# Patient Record
Sex: FEMALE | Race: WHITE | NOT HISPANIC OR LATINO | Employment: FULL TIME | URBAN - METROPOLITAN AREA
[De-identification: names, ages, dates, MRNs, and addresses within clinical notes are randomized per-mention and may not be internally consistent; named-entity substitution may affect disease eponyms.]

---

## 2021-10-02 ENCOUNTER — IMMUNIZATION (OUTPATIENT)
Dept: VACCINE CLINIC | Facility: HOSPITAL | Age: 61
End: 2021-10-02

## 2021-10-02 PROCEDURE — 91300 HC SARSCOV02 VAC 30MCG/0.3ML IM: CPT | Performed by: INTERNAL MEDICINE

## 2021-10-02 PROCEDURE — 0001A: CPT | Performed by: INTERNAL MEDICINE

## 2021-10-02 PROCEDURE — 0004A ADM SARSCOV2 30MCG/0.3ML BOOSTER: CPT | Performed by: INTERNAL MEDICINE

## 2021-10-29 ENCOUNTER — OFFICE VISIT (OUTPATIENT)
Dept: INTERNAL MEDICINE | Facility: CLINIC | Age: 61
End: 2021-10-29

## 2021-10-29 VITALS
WEIGHT: 126.8 LBS | TEMPERATURE: 97.1 F | SYSTOLIC BLOOD PRESSURE: 127 MMHG | OXYGEN SATURATION: 97 % | HEIGHT: 67 IN | DIASTOLIC BLOOD PRESSURE: 72 MMHG | BODY MASS INDEX: 19.9 KG/M2 | HEART RATE: 102 BPM

## 2021-10-29 DIAGNOSIS — M25.361 KNEE INSTABILITY, RIGHT: Chronic | ICD-10-CM

## 2021-10-29 DIAGNOSIS — Z00.00 ROUTINE ADULT HEALTH MAINTENANCE: Primary | ICD-10-CM

## 2021-10-29 DIAGNOSIS — Z98.890 HISTORY OF BENIGN BREAST BIOPSY: ICD-10-CM

## 2021-10-29 DIAGNOSIS — E55.9 VITAMIN D DEFICIENCY: ICD-10-CM

## 2021-10-29 DIAGNOSIS — M77.11 RIGHT TENNIS ELBOW: Chronic | ICD-10-CM

## 2021-10-29 PROBLEM — E56.9 VITAMIN DEFICIENCY: Status: ACTIVE | Noted: 2021-10-29

## 2021-10-29 PROCEDURE — 99386 PREV VISIT NEW AGE 40-64: CPT | Performed by: NURSE PRACTITIONER

## 2021-10-29 RX ORDER — MELATONIN
1000 DAILY
COMMUNITY
End: 2022-10-14

## 2021-10-29 RX ORDER — CETIRIZINE HYDROCHLORIDE 10 MG/1
10 TABLET ORAL DAILY
COMMUNITY

## 2021-10-29 NOTE — PROGRESS NOTES
"Chief Complaint  Establish Care (new patient)/Annual Physical    Subjective          Soledad Gee presents to [facility]  History of Present Illness  This is a 59 y/o female presenting to office to establish care and for annual physical. Patient is currently living alone. Patient is  but currently . Patient has 3 adult children. Patient currently works in neurology as a nurse practitioner.    Patient reports current exercise-- daily. Patient reports following healthy diet-- patient currently follows special diet of gluten free and meat free.     Patient denies any tobacco use. Patient reports current occasional alcohol use-- maybe 2-3 drinks per month. Patient denies illicit drug use.     Patient reports she is not currently following with gynecological. Patient reports last pap smear 3 years ago.     Patient does have history of left excisional breast biopsy that was found benign in her age of 22's. Patient would like to have mammogram done this year. Patient reports she would like to do cologuard this year as well.     Patient reports history of right knee surgery-- patient had disc removed; patient also reports some instability in right knee. Patient reporting right tennis elbow. Some mild tenderness and pain/discomfort. Patient would like to pursue PT for these issues.     Objective   Vital Signs:   /72 (BP Location: Left arm, Patient Position: Sitting, Cuff Size: Adult)   Pulse 102   Temp 97.1 °F (36.2 °C) (Temporal)   Ht 170.2 cm (67\")   Wt 57.5 kg (126 lb 12.8 oz)   SpO2 97%   BMI 19.86 kg/m²     Physical Exam  Vitals and nursing note reviewed.   Constitutional:       Appearance: Normal appearance. She is normal weight.   HENT:      Head: Normocephalic and atraumatic.   Eyes:      Extraocular Movements: Extraocular movements intact.      Conjunctiva/sclera: Conjunctivae normal.      Pupils: Pupils are equal, round, and reactive to light. "   Cardiovascular:      Rate and Rhythm: Normal rate and regular rhythm.      Pulses: Normal pulses.      Heart sounds: Normal heart sounds. No murmur heard.  No friction rub. No gallop.    Pulmonary:      Effort: Pulmonary effort is normal. No respiratory distress.      Breath sounds: Normal breath sounds. No stridor. No wheezing, rhonchi or rales.   Abdominal:      General: There is no distension.      Palpations: Abdomen is soft.      Tenderness: There is no abdominal tenderness.   Musculoskeletal:         General: Tenderness present.      Cervical back: Normal range of motion and neck supple.      Comments: Right knee instability   Skin:     General: Skin is warm and dry.   Neurological:      General: No focal deficit present.      Mental Status: She is alert and oriented to person, place, and time. Mental status is at baseline.   Psychiatric:         Mood and Affect: Mood normal.         Behavior: Behavior normal.         Thought Content: Thought content normal.         Judgment: Judgment normal.        Result Review :            Assessment and Plan    Diagnoses and all orders for this visit:    1. Routine adult health maintenance (Primary)  Assessment & Plan:  Continue with 150 minutes weekly exercise.   Continue with healthy choices per [employer] food guidance.  A1C/CBC/CMP/Lipid Panel/Hep C screening.   Anticipatory guidance given regarding health prevention/wellness, diet/exercise, tobacco/alcohol/drug education, exercise and wellbeing, covid 19 guidance, and sexual health/STD education.       Orders:  -     Cologuard - Stool, Per Rectum; Future  -     Mammo Screening Bilateral With CAD; Future  -     Hepatitis C antibody; Future  -     Cancel: CBC No Differential; Future  -     Comprehensive metabolic panel; Future  -     Lipid panel; Future  -     Vitamin B12  -     Folate  -     CBC & Differential  -     Lipid panel  -     Comprehensive metabolic panel  -     Hepatitis C antibody    2. History of benign breast  biopsy    3. Knee instability, right  Assessment & Plan:  Refer to PT.     Orders:  -     Ambulatory Referral to Physical Therapy Evaluate and treat    4. Right tennis elbow  Assessment & Plan:  Refer to PT.     Orders:  -     Ambulatory Referral to Physical Therapy Evaluate and treat    5. Vitamin D deficiency  Assessment & Plan:  Recheck levels today.   Continues on supplementation    Orders:  -     Vitamin D 25 hydroxy      Follow Up   No follow-ups on file.  Patient was given instructions and counseling regarding her condition or for health maintenance advice. Please see specific information pulled into the AVS if appropriate.

## 2021-10-29 NOTE — PATIENT INSTRUCTIONS
Health Maintenance, Female  Adopting a healthy lifestyle and getting preventive care are important in promoting health and wellness. Ask your health care provider about:  · The right schedule for you to have regular tests and exams.  · Things you can do on your own to prevent diseases and keep yourself healthy.  What should I know about diet, weight, and exercise?  Eat a healthy diet    · Eat a diet that includes plenty of vegetables, fruits, low-fat dairy products, and lean protein.  · Do not eat a lot of foods that are high in solid fats, added sugars, or sodium.    Maintain a healthy weight  Body mass index (BMI) is used to identify weight problems. It estimates body fat based on height and weight. Your health care provider can help determine your BMI and help you achieve or maintain a healthy weight.  Get regular exercise  Get regular exercise. This is one of the most important things you can do for your health. Most adults should:  · Exercise for at least 150 minutes each week. The exercise should increase your heart rate and make you sweat (moderate-intensity exercise).  · Do strengthening exercises at least twice a week. This is in addition to the moderate-intensity exercise.  · Spend less time sitting. Even light physical activity can be beneficial.  Watch cholesterol and blood lipids  Have your blood tested for lipids and cholesterol at 20 years of age, then have this test every 5 years.  Have your cholesterol levels checked more often if:  · Your lipid or cholesterol levels are high.  · You are older than 40 years of age.  · You are at high risk for heart disease.  What should I know about cancer screening?  Depending on your health history and family history, you may need to have cancer screening at various ages. This may include screening for:  · Breast cancer.  · Cervical cancer.  · Colorectal cancer.  · Skin cancer.  · Lung cancer.  What should I know about heart disease, diabetes, and high blood  pressure?  Blood pressure and heart disease  · High blood pressure causes heart disease and increases the risk of stroke. This is more likely to develop in people who have high blood pressure readings, are of  descent, or are overweight.  · Have your blood pressure checked:  ? Every 3-5 years if you are 18-39 years of age.  ? Every year if you are 40 years old or older.  Diabetes  Have regular diabetes screenings. This checks your fasting blood sugar level. Have the screening done:  · Once every three years after age 40 if you are at a normal weight and have a low risk for diabetes.  · More often and at a younger age if you are overweight or have a high risk for diabetes.  What should I know about preventing infection?  Hepatitis B  If you have a higher risk for hepatitis B, you should be screened for this virus. Talk with your health care provider to find out if you are at risk for hepatitis B infection.  Hepatitis C  Testing is recommended for:  · Everyone born from 1945 through 1965.  · Anyone with known risk factors for hepatitis C.  Sexually transmitted infections (STIs)  · Get screened for STIs, including gonorrhea and chlamydia, if:  ? You are sexually active and are younger than 24 years of age.  ? You are older than 24 years of age and your health care provider tells you that you are at risk for this type of infection.  ? Your sexual activity has changed since you were last screened, and you are at increased risk for chlamydia or gonorrhea. Ask your health care provider if you are at risk.  · Ask your health care provider about whether you are at high risk for HIV. Your health care provider may recommend a prescription medicine to help prevent HIV infection. If you choose to take medicine to prevent HIV, you should first get tested for HIV. You should then be tested every 3 months for as long as you are taking the medicine.  Pregnancy  · If you are about to stop having your period (premenopausal) and  you may become pregnant, seek counseling before you get pregnant.  · Take 400 to 800 micrograms (mcg) of folic acid every day if you become pregnant.  · Ask for birth control (contraception) if you want to prevent pregnancy.  Osteoporosis and menopause  Osteoporosis is a disease in which the bones lose minerals and strength with aging. This can result in bone fractures. If you are 65 years old or older, or if you are at risk for osteoporosis and fractures, ask your health care provider if you should:  · Be screened for bone loss.  · Take a calcium or vitamin D supplement to lower your risk of fractures.  · Be given hormone replacement therapy (HRT) to treat symptoms of menopause.  Follow these instructions at home:  Lifestyle  · Do not use any products that contain nicotine or tobacco, such as cigarettes, e-cigarettes, and chewing tobacco. If you need help quitting, ask your health care provider.  · Do not use street drugs.  · Do not share needles.  · Ask your health care provider for help if you need support or information about quitting drugs.  Alcohol use  · Do not drink alcohol if:  ? Your health care provider tells you not to drink.  ? You are pregnant, may be pregnant, or are planning to become pregnant.  · If you drink alcohol:  ? Limit how much you use to 0-1 drink a day.  ? Limit intake if you are breastfeeding.  · Be aware of how much alcohol is in your drink. In the [place] one drink equals one 12 oz bottle of beer (355 mL), one 5 oz glass of wine (148 mL), or one 1½ oz glass of hard liquor (44 mL).  General instructions  · Schedule regular health, dental, and eye exams.  · Stay current with your vaccines.  · Tell your health care provider if:  ? You often feel depressed.  ? You have ever been abused or do not feel safe at home.  Summary  · Adopting a healthy lifestyle and getting preventive care are important in promoting health and wellness.  · Follow your health care provider's instructions about healthy  diet, exercising, and getting tested or screened for diseases.  · Follow your health care provider's instructions on monitoring your cholesterol and blood pressure.  This information is not intended to replace advice given to you by your health care provider. Make sure you discuss any questions you have with your health care provider.  Document Revised: 12/11/2019 Document Reviewed: 12/11/2019  [employer] Patient Education © 2021 [employer]    Immunization Schedule, 50-64 Years Old    Vaccines are usually given at various ages, according to a schedule. Your health care provider will recommend vaccines for you based on your age, medical history, and lifestyle or other factors such as travel or where you work.  You may receive vaccines as individual doses or as more than one vaccine together in one shot (combination vaccines). Talk with your health care provider about the risks and benefits of combination vaccines.  Recommended immunizations for 50-64 years old  Influenza vaccine  · You should get a dose of the influenza vaccine every year.  Tetanus, diphtheria, and pertussis vaccine  A vaccine that protects against tetanus, diphtheria, and pertussis is known as the Tdap vaccine. A vaccine that protects against tetanus and diphtheria is known as the Td vaccine.  · You should only get the Td vaccine if you have had at least 1 dose of the Tdap vaccine.  · You should get 1 dose of the Td or Tdap vaccine every 10 years, or you should get 1 dose of the Tdap vaccine if:  ? You have not previously gotten a Tdap vaccine.  ? You do not know if you have ever gotten a Tdap vaccine.  Zoster vaccine  This is also known as the RZV vaccine. You should get 2 doses of the RZV vaccine 2 to 6 months apart. It is important to get the RZV vaccine even if you:  · Have had shingles.  · Have received the ZVL vaccine, an older version of the RZV vaccine.  · Are unsure if you have had chickenpox (varicella).  Pneumococcal conjugate  vaccine  This is also known as the PCV13 vaccine. You should get the PCV13 vaccine as recommended if you have certain high-risk conditions. These include:  · Diabetes.  · Chronic conditions of the heart, lungs, or liver.  · Conditions that affect the body's disease-fighting system (immune system).  Pneumococcal polysaccharide vaccine  This is also known as the PPSV23 vaccine. You should get the PPSV23 vaccine as recommended if you have certain high-risk conditions. These include:  · Diabetes.  · Chronic conditions of the heart, lungs, or liver.  · Conditions that affect the immune system.  Hepatitis A vaccine  This is also known as the HepA vaccine. If you did not get the HepA vaccine previously, you should get it if:  · You are at risk for a hepatitis A infection. You may be at risk for infection if you:  ? Have chronic liver disease.  ? Have HIV or AIDS.  ? Are a man who has sex with men.  ? Use drugs.  ? Are homeless.  ? May be exposed to hepatitis A through work.  ? Travel to countries where hepatitis A is common.  ? Have or will have close contact with someone who was adopted from another country.  · You are not at risk for infection but want protection from hepatitis A.  Hepatitis B vaccine  This is also known as the HepB vaccine. If you did not get the HepB vaccine previously, you should get it if:  · You are at risk for hepatitis B infection. You are at risk if you:  ? Have chronic liver disease.  ? Have HIV or AIDS.  ? Have sex with a partner who has hepatitis B, or:  § You have multiple sex partners.  § You are a man who has sex with men.  ? Use drugs.  ? May be exposed to hepatitis B through work.  ? Live with someone who has hepatitis B.  ? Receive dialysis treatment.  ? Have diabetes.  ? Travel to countries where hepatitis B is common.  · You are not at risk of infection but want protection from hepatitis B.  Measles, mumps, and rubella vaccine  This is also known as the MMR vaccine. You may need to get  the MMR vaccine if you were born in 1957 or later and:  · You need to catch up on doses you missed in the past.  · You have not been given the vaccine before.  · You do not have evidence of immunity (by a blood test).  Varicella vaccine  This is also known as the TERRANCE vaccine. You may need to get the TERRANCE vaccine if you do not have evidence of immunity (by a blood test) and you may be exposed to varicella through work. This is especially important if you work in a health care setting.  Meningococcal conjugate vaccine  This is also known as the MenACWY vaccine. You may need to get the MenACWY vaccine if you:  · Have not been given the vaccine before.  · Need to catch up on doses you missed in the past.  This vaccine is especially important if you:  · Do not have a spleen.  · Have sickle cell disease.  · Have HIV.  · Take medicines that suppress your immune system.  · Travel to countries where meningococcal disease is common.  · Are exposed to Neisseria meningitidis at work.  Serogroup B meningococcal vaccine  This is also known as the MenB vaccine. You may need to get the MenB vaccine if you:  · Have not been given the vaccine before.  · Need to catch up on doses you missed in the past.  This vaccine is especially important if you:  · Do not have a spleen.  · Have sickle cell disease.  · Take medicines that suppress your immune system.  · Are exposed to Neisseria meningitidis at work.  Haemophilus influenzae type b vaccine  This is also known as the Hib vaccine. Anyone older than 5 years of age is usually not given the Hib vaccine. However, if you have certain high-risk conditions, you may need to get this vaccine. These conditions include:  · Not having a spleen.  · Having received a stem cell transplant.  Before you get a vaccine:  Talk with your health care provider about which vaccines are right for you. This is especially important if:  · You previously had a reaction after getting a vaccine.  · You have a  weakened immune system. You may have a weakened immune system if you:  ? Are taking medicines that reduce (suppress) the activity of your immune system.  ? Are taking medicines to treat cancer (chemotherapy).  ? Have HIV or AIDS.  · You work in an environment where you may be exposed to a disease.  · You plan to travel outside of the country.  · You have a chronic illness, such as heart disease, kidney disease, diabetes, or lung disease.  Summary  · Before you get a vaccine, tell your health care provider if you have reacted to vaccines in the past or have a condition that weakens your immune system.  · At 50-64 years, you should get a dose of the flu vaccine every year and a dose of the Td or Tdap vaccine every 10 years.  · You should get 2 doses of the RZV vaccine 2 to 6 months apart.  · Depending on your medical history and your risk factors, you may need other vaccines. Ask your health care provider whether you are up to date on all your vaccines.  This information is not intended to replace advice given to you by your health care provider. Make sure you discuss any questions you have with your health care provider.  Document Revised: 10/13/2020 Document Reviewed: 10/13/2020  [employer] Patient Education © 2021 [employer]    Medical Screening Exam    A medical screening exam helps determine whether or not you need immediate medical treatment. This type of exam may be done in the emergency department, an urgent care setting, or your health care provider's office.  During the exam, a health care provider does a short physical exam and asks about your medical history to assess:  · Your current symptoms.  · Your overall health.  Depending on your symptoms, you may need additional tests.  What are the possible outcomes of a medical screening exam?  Your medical screening exam may determine that:  · You do not need emergency treatment at this time.  · You need treatment right away.  · You need to be transferred to  another medical center.  · You need to have more tests.  A medical specialist may be consulted if necessary.  When should I seek medical care?  If you have a regular health care provider, make an appointment for a follow-up visit with him or her. If you do not have a regular health care provider, ask about resources in your community.  Get help right away if:  · Your condition gets worse or you develop new or troubling symptoms before you see your health care provider. If this occurs, go to an emergency department right away.  In an emergency:  · Call 911 or have someone drive you to the nearest hospital.  · Do not drive yourself.  Summary  · A medical screening exam helps determine whether or not you need immediate medical treatment.  · During the exam, a health care provider does a short physical exam and asks about your current symptoms and overall health.  · More tests may be ordered during the exam.  · You may need to be transferred to another medical center.  This information is not intended to replace advice given to you by your health care provider. Make sure you discuss any questions you have with your health care provider.  Document Revised: 04/10/2020 Document Reviewed: 07/16/2019  [employer] Patient Education © 2021 [employer]    Tennis Elbow    Tennis elbow is irritation and swelling (inflammation) in your outer forearm, near your elbow. Swelling affects the tissues that connect muscle to bone (tendons). Tennis elbow can happen playing any sport or doing any job where you use your elbow too much. It is caused by doing the same motion over and over.  What are the causes?  This condition is often caused by playing sports or doing work where you need to keep moving your forearm the same way. Sometimes, it may be caused by a sudden injury.  What increases the risk?  You are more likely to get tennis elbow if you play tennis or another racket sport. You also have a higher risk if you often use your hands  for work. This includes:  · People who use computers.  · Construction workers.  · People who work in a factory.  · Musicians.  · Cooks.  · [place].  What are the signs or symptoms?  · Pain and tenderness in your forearm and the outer part of your elbow. You may have pain all the time or only when you use your arm.  · A burning feeling. This starts in your elbow and spreads down your arm.  · A weak  in your hand.  How is this treated?  Resting and icing your arm is often the first treatment. Your doctor may also recommend:  · Medicines to reduce pain and swelling.  · An elbow strap.  · Physical therapy. This may include massage or exercises or both.  · An elbow brace.  If these do not help your symptoms get better, your doctor may recommend surgery.  Follow these instructions at home:  If you have a brace or strap:  · Wear the brace or strap as told by your doctor. Take it off only as told by your doctor.  · Check the skin around the brace or strap every day. Tell your doctor if you see problems.  · Loosen it if your fingers:  ? Tingle.  ? Become numb.  ? Turn cold and blue.  · Keep the brace or strap clean.  · If the brace or strap is not waterproof:  ? Do not let it get wet.  ? Cover it with a watertight covering when you take a bath or a shower.  Managing pain, stiffness, and swelling    · If told, put ice on the injured area. To do this:  ? If you have a removable brace or strap, take it off as told by your doctor.  ? Put ice in a plastic bag.  ? Place a towel between your skin and the bag.  ? Leave the ice on for 20 minutes, 2-3 times a day.  ? Take off the ice if your skin turns bright red. This is very important. If you cannot feel pain, heat, or cold, you have a greater risk of damage to the area.  · Move your fingers often.    Activity  · Rest your elbow and wrist. Avoid activities that can cause elbow problems as told by your doctor.  · Do exercises as told by your doctor.  · If you lift an object,  lift it with your palm facing up.  Lifestyle  · If your tennis elbow is caused by sports, check your equipment and make sure that:  ? You are using it the right way.  ? It fits you well.  · If your tennis elbow is caused by work or computer use, take breaks often to stretch your arm. Talk with your manager about how you can make your condition better at work.  General instructions  · Take over-the-counter and prescription medicines only as told by your doctor.  · Do not smoke or use any products that contain nicotine or tobacco. If you need help quitting, ask your doctor.  · Keep all follow-up visits.  How is this prevented?  · Before and after being active:  ? Warm up and stretch before being active.  ? Cool down and stretch after being active.  ? Give your body time to rest between activities.  · While being active:  ? Make sure to use equipment that fits you.  ? If you play tennis, put power in your stroke with your lower body. Avoid using your arm only.  · Maintain physical fitness. This includes:  ? Strength.  ? Flexibility.  ? Endurance.  · Do exercises to strengthen the forearm muscles.  Contact a doctor if:  · Your pain does not get better with treatment.  · Your pain gets worse.  · You have weakness in your forearm, hand, or fingers.  · You cannot feel your forearm, hand, or fingers.  Get help right away if:  · Your pain is very bad.  · You cannot move your wrist.  Summary  · Tennis elbow is irritation and swelling (inflammation) in your outer forearm, near your elbow.  · Tennis elbow is caused by doing the same motion over and over.  · Rest your elbow and wrist. Avoid activities as told by your doctor.  · If told, put ice on the injured area for 20 minutes, 2-3 times a day.  This information is not intended to replace advice given to you by your health care provider. Make sure you discuss any questions you have with your health care provider.  Document Revised: 06/29/2021 Document Reviewed:  06/29/2021  Elsevier Patient Education © 2021 [employer]    Tennis Elbow Rehab  Ask your health care provider which exercises are safe for you. Do exercises exactly as told by your health care provider and adjust them as directed. It is normal to feel mild stretching, pulling, tightness, or discomfort as you do these exercises. Stop right away if you feel sudden pain or your pain gets worse. Do not begin these exercises until told by your health care provider.  Stretching and range-of-motion exercises  These exercises warm up your muscles and joints and improve the movement and flexibility of your elbow.  Wrist flexion, assisted    1. Straighten your left / right elbow in front of you with your palm facing down toward the floor.  ? If told by your health care provider, bend your left / right elbow to a 90-degree angle (right angle) at your side instead of holding it straight.  2. With your other hand, gently push over the back of your left / right hand so your fingers point toward the floor (flexion). Stop when you feel a gentle stretch on the back of your forearm.  3. Hold this position for __________ seconds.  Repeat __________ times. Complete this exercise __________ times a day.  Wrist extension, assisted    1. Straighten your left / right elbow in front of you with your palm facing up toward the ceiling.  ? If told by your health care provider, bend your left / right elbow to a 90-degree angle (right angle) at your side instead of holding it straight.  2. With your other hand, gently pull your left / right hand and fingers toward the floor (extension). Stop when you feel a gentle stretch on the palm side of your forearm.  3. Hold this position for __________ seconds.  Repeat __________ times. Complete this exercise __________ times a day.  Assisted forearm rotation, supination  1. Sit or stand with your elbows at your side.  2. Bend your left / right elbow to a 90-degree angle (right angle).  3. Using your  uninjured hand, turn your left / right palm up toward the ceiling (supination) until you feel a gentle stretch along the inside of your forearm.  4. Hold this position for __________ seconds.  Repeat __________ times. Complete this exercise __________ times a day.  Assisted forearm rotation, pronation  1. Sit or stand with your elbows at your side.  2. Bend your left / right elbow to a 90-degree angle (right angle).  3. Using your uninjured hand, turn your left / right palm down toward the floor (pronation) until you feel a gentle stretch along the outside of your forearm.  4. Hold this position for __________ seconds.  Repeat __________ times. Complete this exercise __________ times a day.  Strengthening exercises  These exercises build strength and endurance in your forearm and elbow. Endurance is the ability to use your muscles for a long time, even after they get tired.  Radial deviation    1. Stand with a __________ weight or a hammer in your left / right hand. Or, sit while holding a rubber exercise band or tubing, with your left / right forearm supported on a table or countertop.  ? Position your forearm so that the thumb is facing the ceiling, as if you are going to clap your hands. This is the neutral position.  2. Raise your hand upward in front of you so your thumb moves toward the ceiling (radial deviation), or pull up on the rubber tubing. Keep your forearm and elbow still while you move your wrist only.  3. Hold this position for __________ seconds.  4. Slowly return to the starting position.  Repeat __________ times. Complete this exercise __________ times a day.  Wrist extension, eccentric  1. Sit with your left / right forearm palm-down and supported on a table or other surface. Let your left / right wrist extend over the edge of the surface.  2. Hold a __________ weight or a piece of exercise band or tubing in your left / right hand.  ? If using a rubber exercise band or tubing, hold the other end  of the tubing with your other hand.  3. Use your uninjured hand to move your left / right hand up toward the ceiling.  4. Take your uninjured hand away and slowly return to the starting position using only your left / right hand. Lowering your arm under tension is called eccentric extension.  Repeat __________ times. Complete this exercise __________ times a day.  Wrist extension  Do not do this exercise if it causes pain at the outside of your elbow. Only do this exercise once instructed by your health care provider.  1. Sit with your left / right forearm supported on a table or other surface and your palm turned down toward the floor. Let your left / right wrist extend over the edge of the surface.  2. Hold a __________ weight or a piece of rubber exercise band or tubing.  ? If you are using a rubber exercise band or tubing, hold the band or tubing in place with your other hand to provide resistance.  3. Slowly bend your wrist so your hand moves up toward the ceiling (extension). Move only your wrist, keeping your forearm and elbow still.  4. Hold this position for __________ seconds.  5. Slowly return to the starting position.  Repeat __________ times. Complete this exercise __________ times a day.  Forearm rotation, supination  To do this exercise, you will need a lightweight hammer or rubber mallet.  1. Sit with your left / right forearm supported on a table or other surface. Bend your elbow to a 90-degree angle (right angle). Position your forearm so that your palm is facing down toward the floor, with your hand resting over the edge of the table.  2. Hold a hammer in your left / right hand.  ? To make this exercise easier, hold the hammer near the head of the hammer.  ? To make this exercise harder, hold the hammer near the end of the handle.  3. Without moving your wrist or elbow, slowly rotate your forearm so your palm faces up toward the ceiling (supination).  4. Hold this position for __________  seconds.  5. Slowly return to the starting position.  Repeat __________ times. Complete this exercise __________ times a day.  Shoulder blade squeeze  1. Sit in a stable chair or stand with good posture. If you are sitting down, do not let your back touch the back of the chair.  2. Your arms should be at your sides with your elbows bent to a 90-degree angle (right angle). Position your forearms so that your thumbs are facing the ceiling (neutral position).  3. Without lifting your shoulders up, squeeze your shoulder blades tightly together.  4. Hold this position for __________ seconds.  5. Slowly release and return to the starting position.  Repeat __________ times. Complete this exercise __________ times a day.  This information is not intended to replace advice given to you by your health care provider. Make sure you discuss any questions you have with your health care provider.  Document Revised: 03/10/2021 Document Reviewed: 03/10/2021  Elsevier Patient Education © 2021 [employer]

## 2021-10-29 NOTE — ASSESSMENT & PLAN NOTE
Continue with 150 minutes weekly exercise.   Continue with healthy choices per USDA food guidance.  A1C/CBC/CMP/Lipid Panel/Hep C screening.   Anticipatory guidance given regarding health prevention/wellness, diet/exercise, tobacco/alcohol/drug education, exercise and wellbeing, covid 19 guidance, and sexual health/STD education.

## 2021-10-30 LAB
25(OH)D3+25(OH)D2 SERPL-MCNC: 32.8 NG/ML (ref 30–100)
ALBUMIN SERPL-MCNC: 4.5 G/DL (ref 3.8–4.9)
ALBUMIN/GLOB SERPL: 1.7 {RATIO} (ref 1.2–2.2)
ALP SERPL-CCNC: 90 IU/L (ref 44–121)
ALT SERPL-CCNC: 34 IU/L (ref 0–32)
AST SERPL-CCNC: 23 IU/L (ref 0–40)
BASOPHILS # BLD AUTO: 0 X10E3/UL (ref 0–0.2)
BASOPHILS NFR BLD AUTO: 0 %
BILIRUB SERPL-MCNC: 0.4 MG/DL (ref 0–1.2)
BUN SERPL-MCNC: 12 MG/DL (ref 6–24)
BUN/CREAT SERPL: 16 (ref 9–23)
CALCIUM SERPL-MCNC: 9.8 MG/DL (ref 8.7–10.2)
CHLORIDE SERPL-SCNC: 103 MMOL/L (ref 96–106)
CHOLEST SERPL-MCNC: 216 MG/DL (ref 100–199)
CO2 SERPL-SCNC: 25 MMOL/L (ref 20–29)
CREAT SERPL-MCNC: 0.74 MG/DL (ref 0.57–1)
EOSINOPHIL # BLD AUTO: 0.1 X10E3/UL (ref 0–0.4)
EOSINOPHIL NFR BLD AUTO: 2 %
ERYTHROCYTE [DISTWIDTH] IN BLOOD BY AUTOMATED COUNT: 12.2 % (ref 11.7–15.4)
FOLATE SERPL-MCNC: 11 NG/ML
GLOBULIN SER CALC-MCNC: 2.7 G/DL (ref 1.5–4.5)
GLUCOSE SERPL-MCNC: 90 MG/DL (ref 65–99)
HCT VFR BLD AUTO: 43.4 % (ref 34–46.6)
HCV AB S/CO SERPL IA: <0.1 S/CO RATIO (ref 0–0.9)
HDLC SERPL-MCNC: 71 MG/DL
HGB BLD-MCNC: 14.3 G/DL (ref 11.1–15.9)
IMM GRANULOCYTES # BLD AUTO: 0 X10E3/UL (ref 0–0.1)
IMM GRANULOCYTES NFR BLD AUTO: 0 %
LDLC SERPL CALC-MCNC: 133 MG/DL (ref 0–99)
LYMPHOCYTES # BLD AUTO: 0.9 X10E3/UL (ref 0.7–3.1)
LYMPHOCYTES NFR BLD AUTO: 19 %
Lab: NORMAL
MCH RBC QN AUTO: 28.8 PG (ref 26.6–33)
MCHC RBC AUTO-ENTMCNC: 32.9 G/DL (ref 31.5–35.7)
MCV RBC AUTO: 88 FL (ref 79–97)
MONOCYTES # BLD AUTO: 0.2 X10E3/UL (ref 0.1–0.9)
MONOCYTES NFR BLD AUTO: 5 %
NEUTROPHILS # BLD AUTO: 3.3 X10E3/UL (ref 1.4–7)
NEUTROPHILS NFR BLD AUTO: 74 %
PLATELET # BLD AUTO: 242 X10E3/UL (ref 150–450)
POTASSIUM SERPL-SCNC: 4.5 MMOL/L (ref 3.5–5.2)
PROT SERPL-MCNC: 7.2 G/DL (ref 6–8.5)
RBC # BLD AUTO: 4.96 X10E6/UL (ref 3.77–5.28)
SODIUM SERPL-SCNC: 141 MMOL/L (ref 134–144)
TRIGL SERPL-MCNC: 70 MG/DL (ref 0–149)
VIT B12 SERPL-MCNC: 656 PG/ML (ref 232–1245)
VLDLC SERPL CALC-MCNC: 12 MG/DL (ref 5–40)
WBC # BLD AUTO: 4.5 X10E3/UL (ref 3.4–10.8)

## 2022-10-06 DIAGNOSIS — E55.9 VITAMIN D DEFICIENCY: ICD-10-CM

## 2022-10-06 DIAGNOSIS — Z00.00 ROUTINE ADULT HEALTH MAINTENANCE: Primary | ICD-10-CM

## 2022-10-07 ENCOUNTER — TELEPHONE (OUTPATIENT)
Dept: INTERNAL MEDICINE | Facility: CLINIC | Age: 62
End: 2022-10-07

## 2022-10-07 NOTE — TELEPHONE ENCOUNTER
Hub staff attempted to follow warm transfer process and was unsuccessful     Caller: Soledad Gee    Relationship to patient: Self    Best call back number: [phone]    Patient is needing: THE PATIENT NEEDS TO SET-UP FASTING LABS FOR 8-8:15 SOMETIME WITHIN THE MORNING NEXT WEEK. PLEASE ADVISE.

## 2022-10-07 NOTE — TELEPHONE ENCOUNTER
Hub staff attempted to follow warm transfer process and was unsuccessful     Caller: Soledad Gee    Relationship to patient: Self    Best call back number: [phone]    Patient is needing: ASKING ABOUT SCHEDULING LAB APPOINTMENT

## 2022-10-13 ENCOUNTER — TELEPHONE (OUTPATIENT)
Dept: INTERNAL MEDICINE | Facility: CLINIC | Age: 62
End: 2022-10-13

## 2022-10-14 ENCOUNTER — OFFICE VISIT (OUTPATIENT)
Dept: INTERNAL MEDICINE | Facility: CLINIC | Age: 62
End: 2022-10-14

## 2022-10-14 VITALS
SYSTOLIC BLOOD PRESSURE: 116 MMHG | HEART RATE: 70 BPM | DIASTOLIC BLOOD PRESSURE: 72 MMHG | TEMPERATURE: 95 F | BODY MASS INDEX: 19.78 KG/M2 | OXYGEN SATURATION: 98 % | WEIGHT: 126 LBS | HEIGHT: 67 IN

## 2022-10-14 DIAGNOSIS — Z00.00 ANNUAL PHYSICAL EXAM: Primary | ICD-10-CM

## 2022-10-14 DIAGNOSIS — E78.2 MIXED HYPERLIPIDEMIA: Chronic | ICD-10-CM

## 2022-10-14 DIAGNOSIS — M77.11 RIGHT TENNIS ELBOW: ICD-10-CM

## 2022-10-14 DIAGNOSIS — Z12.31 SCREENING MAMMOGRAM FOR BREAST CANCER: ICD-10-CM

## 2022-10-14 DIAGNOSIS — Z86.39 HISTORY OF NON ANEMIC VITAMIN B12 DEFICIENCY: ICD-10-CM

## 2022-10-14 PROCEDURE — 99396 PREV VISIT EST AGE 40-64: CPT | Performed by: NURSE PRACTITIONER

## 2022-10-14 NOTE — ASSESSMENT & PLAN NOTE
Continue with 150-300 minutes weekly exercise.   Continue with healthy diet choices-- patient follows plant based diet.   Labs reviewed.   Continue with monthly self breast examinations.   F/u with gynecology for annual exam.   Anticipatory guidance given regarding health prevention/wellness, diet/exercise, tobacco/alcohol/drug education, exercise and wellbeing, covid 19 guidance, and sexual health/STD education.     
Lipid abnormalities are improving with lifestyle modifications.  Nutritional counseling was provided.  Lipids will be reassessed in 3 months.  
no

## 2022-10-14 NOTE — PROGRESS NOTES
"Chief Complaint  Annual Exam    Subjective        Soledad Gee presents to [facility]  History of Present Illness  This is a 60 y/o female presenting to office for annual exam. 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     Patient is currently working out regularly and planning big hike excursion for the fall. Patient currently following healthy diet.     Patient denies any tobacco use. Patient reports occasional social alcohol use.     Patient will be scheduling f/u with gynecology at [facility]. Due for pap smear. Due for mammogram.     Patient reports some right elbow pain-- had previously seen PT; Patient would like to see PT again. Reports previously getting dry needling which really helped with this.       Objective   Vital Signs:  /72 (BP Location: Left arm, Patient Position: Sitting, Cuff Size: Small Adult)   Pulse 70   Temp 95 °F (35 °C) (Infrared)   Ht 170.2 cm (67\")   Wt 57.2 kg (126 lb)   SpO2 98%   BMI 19.73 kg/m²   Estimated body mass index is 19.73 kg/m² as calculated from the following:    Height as of this encounter: 170.2 cm (67\").    Weight as of this encounter: 57.2 kg (126 lb).    BMI is within normal parameters. No other follow-up for BMI required.      Physical Exam  Constitutional:       Appearance: Normal appearance. She is normal weight.   HENT:      Head: Normocephalic and atraumatic.      Right Ear: External ear normal.      Left Ear: External ear normal.      Nose: Nose normal.      Mouth/Throat:      Mouth: Mucous membranes are moist.      Pharynx: Oropharynx is clear.   Eyes:      Conjunctiva/sclera: Conjunctivae normal.      Pupils: Pupils are equal, round, and reactive to light.   Neck:      Vascular: No carotid bruit.   Cardiovascular:      Rate and Rhythm: Normal rate and regular rhythm.      Pulses: Normal pulses.      Heart sounds: Normal heart sounds. No murmur heard.    No friction rub. No gallop.   Pulmonary:      Effort: " Pulmonary effort is normal. No respiratory distress.      Breath sounds: Normal breath sounds. No stridor. No wheezing, rhonchi or rales.   Abdominal:      General: Bowel sounds are normal. There is no distension.      Palpations: Abdomen is soft. There is no mass.      Tenderness: There is no abdominal tenderness.      Hernia: No hernia is present.   Musculoskeletal:      Right elbow: Decreased range of motion. Tenderness present.      Cervical back: Normal range of motion and neck supple.   Skin:     General: Skin is warm and dry.   Neurological:      General: No focal deficit present.      Mental Status: She is alert and oriented to person, place, and time. Mental status is at baseline.   Psychiatric:         Mood and Affect: Mood normal.         Thought Content: Thought content normal.        Result Review :  The following data was reviewed by: ANDERSON Curtis on 10/14/2022:  Common labs    Common Labs 10/29/21 10/29/21 10/29/21 10/11/22 10/11/22 10/11/22 10/11/22    0000 0000 0000 0824 0824 0824 0824   Glucose  90   96     BUN  12   10     Creatinine  0.74   0.68     eGFR Non African Am  90        eGFR African Am  103        Sodium  141   143     Potassium  4.5   5.0     Chloride  103   105     Calcium  9.8   10.4     Total Protein  7.2   7.0     Albumin  4.5   4.70     Total Bilirubin  0.4   0.7     Alkaline Phosphatase  90   93     AST (SGOT)  23   23     ALT (SGPT)  34 (A)   20     WBC   4.5 4.63      Hemoglobin   14.3 14.7      Hematocrit   43.4 41.3      Platelets   242 250      Total Cholesterol 216 (A)     215 (A)    Triglycerides 70     80    HDL Cholesterol 71     87 (A)    LDL Cholesterol  133 (A)     114 (A)    Hemoglobin A1C       5.10   (A) Abnormal value       Comments are available for some flowsheets but are not being displayed.                     Assessment and Plan   Diagnoses and all orders for this visit:    1. Annual physical exam (Primary)  Assessment & Plan:  Continue with 150-300  minutes weekly exercise.   Continue with healthy diet choices-- patient follows plant based diet.   Labs reviewed.   Continue with monthly self breast examinations.   F/u with gynecology for annual exam.   Anticipatory guidance given regarding health prevention/wellness, diet/exercise, tobacco/alcohol/drug education, exercise and wellbeing, covid 19 guidance, and sexual health/STD education.         2. Mixed hyperlipidemia  Assessment & Plan:  Lipid abnormalities are improving with lifestyle modifications.  Nutritional counseling was provided.  Lipids will be reassessed in 3 months.    Orders:  -     Cancel: Lipid panel; Future  -     Lipid panel; Future    3. Screening mammogram for breast cancer  -     Mammo screening digital tomosynthesis bilateral w CAD; Future    4. Right tennis elbow  -     Ambulatory Referral to Physical Therapy    5. History of non anemic vitamin B12 deficiency  -     Vitamin B12; Future           Follow Up   Return in about 1 year (around 10/14/2023) for Annual physical.  Patient was given instructions and counseling regarding her condition or for health maintenance advice. Please see specific information pulled into the AVS if appropriate.

## 2022-10-21 ENCOUNTER — APPOINTMENT (OUTPATIENT)
Dept: WOMENS IMAGING | Facility: HOSPITAL | Age: 62
End: 2022-10-21

## 2022-10-21 PROCEDURE — 77063 BREAST TOMOSYNTHESIS BI: CPT | Performed by: RADIOLOGY

## 2022-10-21 PROCEDURE — 77067 SCR MAMMO BI INCL CAD: CPT | Performed by: RADIOLOGY

## 2023-03-13 ENCOUNTER — TELEPHONE (OUTPATIENT)
Dept: INTERNAL MEDICINE | Facility: CLINIC | Age: 63
End: 2023-03-13
Payer: COMMERCIAL

## 2024-01-19 ENCOUNTER — APPOINTMENT (OUTPATIENT)
Dept: WOMENS IMAGING | Facility: HOSPITAL | Age: 64
End: 2024-01-19
Payer: COMMERCIAL

## 2024-01-19 PROCEDURE — 77063 BREAST TOMOSYNTHESIS BI: CPT | Performed by: RADIOLOGY

## 2024-01-19 PROCEDURE — 77067 SCR MAMMO BI INCL CAD: CPT | Performed by: RADIOLOGY

## 2024-03-07 ENCOUNTER — OFFICE VISIT (OUTPATIENT)
Dept: INTERNAL MEDICINE | Facility: CLINIC | Age: 64
End: 2024-03-07
Payer: COMMERCIAL

## 2024-03-07 VITALS
DIASTOLIC BLOOD PRESSURE: 80 MMHG | HEIGHT: 67 IN | WEIGHT: 130 LBS | SYSTOLIC BLOOD PRESSURE: 130 MMHG | OXYGEN SATURATION: 98 % | HEART RATE: 80 BPM | BODY MASS INDEX: 20.4 KG/M2

## 2024-03-07 DIAGNOSIS — Z83.79 FAMILY HISTORY OF CELIAC DISEASE: Chronic | ICD-10-CM

## 2024-03-07 DIAGNOSIS — E78.2 MIXED HYPERLIPIDEMIA: Primary | Chronic | ICD-10-CM

## 2024-03-07 DIAGNOSIS — R74.01 ELEVATED ALT MEASUREMENT: Chronic | ICD-10-CM

## 2024-03-07 PROCEDURE — 99214 OFFICE O/P EST MOD 30 MIN: CPT | Performed by: NURSE PRACTITIONER

## 2024-03-07 NOTE — PROGRESS NOTES
"Chief Complaint  Hyperlipidemia    Subjective        Soledad Gee presents to [facility]  Hyperlipidemia      This is a 62 y/o female presenting to office for f/u with health concerns.     Patient has hx of HLD; concern due to familiar risk of CVD hx-- father had stroke; reports she has been trying to increase exercise and following healthy diet choices.     Recent  on lipid panel 1/26/24;     The 10-year ASCVD risk score (Emely ARMAS, et al., 2019) is: 3%    Values used to calculate the score:      Age: 63 years      Sex: Female      Is Non- : No      Diabetic: No      Tobacco smoker: No      Systolic Blood Pressure: 130 mmHg      Is BP treated: No      HDL Cholesterol: 87 mg/dL      Total Cholesterol: 215 mg/dL      Also concerned due to son's hx of celiac disease; her recent ALT was 42; she is concerned this may be related. Reports she does get dermatitis rashes that flare up from time to time. Reports she has not been eating any sort  of wheat products. Would like to introduce wheat products and check celiac panel in 6 weeks post reintroduction.   Reports she does have hx of seasonal allergies-- reports she would be interested in seeing allergist in the future.         Objective   Vital Signs:  /80 (BP Location: Left arm, Patient Position: Sitting, Cuff Size: Adult)   Pulse 80   Ht 170.2 cm (67\")   Wt 59 kg (130 lb)   SpO2 98%   BMI 20.36 kg/m²   Estimated body mass index is 20.36 kg/m² as calculated from the following:    Height as of this encounter: 170.2 cm (67\").    Weight as of this encounter: 59 kg (130 lb).       BMI is within normal parameters. No other follow-up for BMI required.      Physical Exam  Constitutional:       Appearance: Normal appearance.   HENT:      Head: Normocephalic and atraumatic.      Right Ear: External ear normal.      Left Ear: External ear normal.      Nose: Nose normal.      Mouth/Throat:      Mouth: Mucous " membranes are moist.      Pharynx: Oropharynx is clear.   Eyes:      Conjunctiva/sclera: Conjunctivae normal.      Pupils: Pupils are equal, round, and reactive to light.   Cardiovascular:      Rate and Rhythm: Normal rate and regular rhythm.      Pulses: Normal pulses.      Heart sounds: Normal heart sounds.   Skin:     Capillary Refill: Capillary refill takes less than 2 seconds.   Neurological:      Mental Status: She is alert and oriented to person, place, and time. Mental status is at baseline.   Psychiatric:         Mood and Affect: Mood normal.         Thought Content: Thought content normal.         Judgment: Judgment normal.        Result Review :    The following data was reviewed by: ANDERSON Curtis on 03/07/2024:    Tobacco Use: Medium Risk (3/7/2024)    Patient History     Smoking Tobacco Use: Former     Smokeless Tobacco Use: Never     Passive Exposure: Not on file     Social History     Substance and Sexual Activity   Alcohol Use Yes    Alcohol/week: 2.0 standard drinks of alcohol    Types: 1 Glasses of wine, 1 Standard drinks or equivalent per week     Family History   Problem Relation Age of Onset    Cancer Mother         breast cancer    Stroke Father     Cancer Father         melanoma    Hypertension Father     Cancer Maternal Aunt     Cancer Maternal Uncle     Stroke Paternal Grandmother                   Assessment and Plan     Diagnoses and all orders for this visit:    1. Mixed hyperlipidemia (Primary)  -     Lipid panel; Future    2. Elevated ALT measurement  -     Celiac Disease Panel; Future  -     Hepatic Function Panel; Future    3. Family history of celiac disease  -     Celiac Disease Panel; Future      Patient to return to lab in about 6 weeks-- will introduce gluten into her diet for true celiac testing. Patient would like to wait to think about medication based upon results from lab work in 6 weeks. ASCVD score low risk.        Follow Up     Return in about 6 months (around  9/7/2024) for Annual physical.  Patient was given instructions and counseling regarding her condition or for health maintenance advice. Please see specific information pulled into the AVS if appropriate.

## 2024-04-14 ENCOUNTER — PATIENT MESSAGE (OUTPATIENT)
Dept: INTERNAL MEDICINE | Facility: CLINIC | Age: 64
End: 2024-04-14
Payer: COMMERCIAL

## 2024-04-14 DIAGNOSIS — Z83.79 FAMILY HISTORY OF CELIAC DISEASE: Primary | ICD-10-CM

## 2024-04-15 NOTE — TELEPHONE ENCOUNTER
From: Soledad Gee  To: Sandi Guerrero  Sent: 4/14/2024 12:06 PM EDT  Subject: Celiac testing    I am scheduled to have celiac antibody and other labs drawn later this week. Would you also order antibody testing for wheat allergy? A very long time ago (25-30 years), I was told I was allergic to wheat but I don't remember all of the details. I've been doing a gluten/wheat challenge so am thinking this would be a good time to also check for wheat antibodies.  Thank you!

## 2024-04-22 ENCOUNTER — LAB (OUTPATIENT)
Facility: HOSPITAL | Age: 64
End: 2024-04-22
Payer: COMMERCIAL

## 2024-04-22 DIAGNOSIS — R74.01 ELEVATED ALT MEASUREMENT: Chronic | ICD-10-CM

## 2024-04-22 DIAGNOSIS — E78.2 MIXED HYPERLIPIDEMIA: Chronic | ICD-10-CM

## 2024-04-22 DIAGNOSIS — Z83.79 FAMILY HISTORY OF CELIAC DISEASE: ICD-10-CM

## 2024-04-22 LAB
ALBUMIN SERPL-MCNC: 4.3 G/DL (ref 3.5–5.2)
ALP SERPL-CCNC: 92 U/L (ref 39–117)
ALT SERPL W P-5'-P-CCNC: 24 U/L (ref 1–33)
AST SERPL-CCNC: 18 U/L (ref 1–32)
BILIRUB CONJ SERPL-MCNC: <0.2 MG/DL (ref 0–0.3)
BILIRUB INDIRECT SERPL-MCNC: NORMAL MG/DL
BILIRUB SERPL-MCNC: 0.4 MG/DL (ref 0–1.2)
CHOLEST SERPL-MCNC: 228 MG/DL (ref 0–200)
HDLC SERPL-MCNC: 78 MG/DL (ref 40–60)
LDLC SERPL CALC-MCNC: 141 MG/DL (ref 0–100)
LDLC/HDLC SERPL: 1.78 {RATIO}
PROT SERPL-MCNC: 7.2 G/DL (ref 6–8.5)
TRIGL SERPL-MCNC: 55 MG/DL (ref 0–150)
VLDLC SERPL-MCNC: 9 MG/DL (ref 5–40)

## 2024-04-22 PROCEDURE — 80076 HEPATIC FUNCTION PANEL: CPT

## 2024-04-22 PROCEDURE — 80061 LIPID PANEL: CPT

## 2024-04-22 PROCEDURE — 86231 EMA EACH IG CLASS: CPT

## 2024-04-22 PROCEDURE — 82784 ASSAY IGA/IGD/IGG/IGM EACH: CPT

## 2024-04-22 PROCEDURE — 36415 COLL VENOUS BLD VENIPUNCTURE: CPT

## 2024-04-22 PROCEDURE — 86003 ALLG SPEC IGE CRUDE XTRC EA: CPT

## 2024-04-22 PROCEDURE — 86364 TISS TRNSGLTMNASE EA IG CLAS: CPT

## 2024-04-22 NOTE — PROGRESS NOTES
The 10-year ASCVD risk score (Emely ARMAS, et al., 2019) is: 3.3%    Values used to calculate the score:      Age: 63 years      Sex: Female      Is Non- : No      Diabetic: No      Tobacco smoker: No      Systolic Blood Pressure: 130 mmHg      Is BP treated: No      HDL Cholesterol: 78 mg/dL      Total Cholesterol: 228 mg/dL

## 2024-04-23 LAB
ENDOMYSIUM IGA SER QL: NEGATIVE
IGA SERPL-MCNC: 112 MG/DL (ref 87–352)
TTG IGA SER-ACNC: <2 U/ML (ref 0–3)

## 2024-04-26 LAB — WHEAT IGE QN: <0.1 KU/L

## 2024-05-09 ENCOUNTER — CLINICAL SUPPORT (OUTPATIENT)
Dept: INTERNAL MEDICINE | Facility: CLINIC | Age: 64
End: 2024-05-09
Payer: COMMERCIAL

## 2024-05-09 DIAGNOSIS — Z23 IMMUNIZATION DUE: Primary | ICD-10-CM

## 2024-05-09 PROCEDURE — 90480 ADMN SARSCOV2 VAC 1/ONLY CMP: CPT | Performed by: NURSE PRACTITIONER

## 2024-05-09 PROCEDURE — 91320 SARSCV2 VAC 30MCG TRS-SUC IM: CPT | Performed by: NURSE PRACTITIONER

## 2025-07-07 ENCOUNTER — LAB (OUTPATIENT)
Facility: HOSPITAL | Age: 65
End: 2025-07-07
Payer: COMMERCIAL

## 2025-07-07 PROCEDURE — 83036 HEMOGLOBIN GLYCOSYLATED A1C: CPT | Performed by: NURSE PRACTITIONER

## 2025-07-07 PROCEDURE — 80050 GENERAL HEALTH PANEL: CPT | Performed by: NURSE PRACTITIONER

## 2025-07-07 PROCEDURE — 82306 VITAMIN D 25 HYDROXY: CPT | Performed by: NURSE PRACTITIONER

## 2025-07-07 PROCEDURE — 82607 VITAMIN B-12: CPT | Performed by: NURSE PRACTITIONER

## 2025-07-07 PROCEDURE — 80061 LIPID PANEL: CPT | Performed by: NURSE PRACTITIONER

## 2025-07-11 ENCOUNTER — OFFICE VISIT (OUTPATIENT)
Dept: INTERNAL MEDICINE | Facility: CLINIC | Age: 65
End: 2025-07-11
Payer: COMMERCIAL

## 2025-07-11 ENCOUNTER — HOSPITAL ENCOUNTER (OUTPATIENT)
Facility: HOSPITAL | Age: 65
Discharge: HOME | End: 2025-07-11
Admitting: NURSE PRACTITIONER
Payer: COMMERCIAL

## 2025-07-11 VITALS
BODY MASS INDEX: 20.88 KG/M2 | OXYGEN SATURATION: 97 % | DIASTOLIC BLOOD PRESSURE: 80 MMHG | HEART RATE: 75 BPM | WEIGHT: 133 LBS | HEIGHT: 67 IN | SYSTOLIC BLOOD PRESSURE: 122 MMHG

## 2025-07-11 DIAGNOSIS — E78.2 MIXED HYPERLIPIDEMIA: Chronic | ICD-10-CM

## 2025-07-11 DIAGNOSIS — M25.552 LEFT HIP PAIN: Chronic | ICD-10-CM

## 2025-07-11 DIAGNOSIS — M25.552 LEFT HIP PAIN: ICD-10-CM

## 2025-07-11 DIAGNOSIS — Z86.39 HISTORY OF NON ANEMIC VITAMIN B12 DEFICIENCY: Chronic | ICD-10-CM

## 2025-07-11 DIAGNOSIS — Z12.31 SCREENING MAMMOGRAM FOR BREAST CANCER: ICD-10-CM

## 2025-07-11 DIAGNOSIS — Z00.00 ANNUAL PHYSICAL EXAM: Primary | ICD-10-CM

## 2025-07-11 DIAGNOSIS — E55.9 VITAMIN D DEFICIENCY: Chronic | ICD-10-CM

## 2025-07-11 DIAGNOSIS — L40.9 PSORIASIS: Chronic | ICD-10-CM

## 2025-07-11 DIAGNOSIS — Z12.11 COLON CANCER SCREENING: ICD-10-CM

## 2025-07-11 PROCEDURE — 73502 X-RAY EXAM HIP UNI 2-3 VIEWS: CPT

## 2025-07-11 RX ORDER — DIPHENHYDRAMINE HCL 25 MG
25 CAPSULE ORAL EVERY 6 HOURS PRN
COMMUNITY

## 2025-07-11 RX ORDER — CLOBETASOL PROPIONATE 0.5 MG/G
1 CREAM TOPICAL 2 TIMES DAILY
Qty: 60 G | Refills: 2 | Status: SHIPPED | OUTPATIENT
Start: 2025-07-11

## 2025-07-11 RX ORDER — ATORVASTATIN CALCIUM 10 MG/1
10 TABLET, FILM COATED ORAL DAILY
Qty: 90 TABLET | Refills: 1 | Status: SHIPPED | OUTPATIENT
Start: 2025-07-11

## 2025-07-11 NOTE — PROGRESS NOTES
"Chief Complaint  Annual Exam    Subjective        Soledad Gee presents to [facility]  History of Present Illness  This is a 63 y/o female presenting to office for CPE    Reports active lifestyle  Following vegetarian diet    Cologuard is due  Mammogram--1/2024; due  [facility]-- [facility]-- last seen 1.5 years ago     Vitamin D-- will be starting supplement; also increasing yogurt intake.     Reports some intermittent left hip pain; reports she has noticed when sitting on longer car rides or with certain exercises. Reports this is very intermittent and does not follow specific pattern. She is interested in imaging of this.     She also reports some skin involvement including some erythematic grouped patching on her arms, left shoulder/axial region, and in her scalp. She reports this was worsened when she traveled out west to the [facility] region; she has a son who does have psoriasis. She reports her skin has been improving since being back home in [place]. Denies any formal dx of eczema vs psoriasis.     Objective   Vital Signs:  /80 (BP Location: Left arm, Patient Position: Sitting, Cuff Size: Adult)   Pulse 75   Ht 170.2 cm (67\")   Wt 60.3 kg (133 lb)   SpO2 97%   BMI 20.83 kg/m²   Estimated body mass index is 20.83 kg/m² as calculated from the following:    Height as of this encounter: 170.2 cm (67\").    Weight as of this encounter: 60.3 kg (133 lb).    BMI is within normal parameters. No other follow-up for BMI required.      Physical Exam  Constitutional:       General: She is awake.      Appearance: Normal appearance.   HENT:      Head: Normocephalic and atraumatic.      Right Ear: Hearing, tympanic membrane, ear canal and external ear normal.      Left Ear: Hearing, tympanic membrane, ear canal and external ear normal.      Nose: Nose normal.      Mouth/Throat:      Lips: Pink.      Mouth: Mucous membranes are moist.      Pharynx: Oropharynx is " clear.   Eyes:      Extraocular Movements: Extraocular movements intact.      Conjunctiva/sclera: Conjunctivae normal.      Pupils: Pupils are equal, round, and reactive to light.   Cardiovascular:      Rate and Rhythm: Normal rate and regular rhythm.      Pulses: Normal pulses.      Heart sounds: Normal heart sounds. No murmur heard.     No friction rub. No gallop.   Pulmonary:      Effort: Pulmonary effort is normal. No respiratory distress.      Breath sounds: Normal breath sounds. No stridor. No wheezing, rhonchi or rales.   Abdominal:      General: Bowel sounds are normal.      Palpations: Abdomen is soft.      Tenderness: There is no abdominal tenderness.   Musculoskeletal:         General: Normal range of motion.      Cervical back: Normal range of motion and neck supple.      Comments: Reports when hip pain is flaring it is more lateral over trochanter region with sometimes posterior movement   Skin:     General: Skin is warm and dry.      Capillary Refill: Capillary refill takes less than 2 seconds.          Neurological:      General: No focal deficit present.      Mental Status: She is alert and oriented to person, place, and time. Mental status is at baseline.      Motor: Motor function is intact.      Coordination: Coordination is intact.      Gait: Gait is intact.      Deep Tendon Reflexes: Reflexes are normal and symmetric.   Psychiatric:         Attention and Perception: Attention normal.         Mood and Affect: Mood normal.         Speech: Speech normal.         Behavior: Behavior normal. Behavior is cooperative.         Thought Content: Thought content normal.         Cognition and Memory: Cognition normal.         Judgment: Judgment normal.        Result Review :  The following data was reviewed by: ANDERSON Curtis on 07/11/2025:  Common labs          7/7/2025    09:47   Common Labs   Glucose 87    BUN 10.0    Creatinine 0.69    Sodium 139    Potassium 4.3    Chloride 106    Calcium 9.4     Albumin 4.3    Total Bilirubin 0.4    Alkaline Phosphatase 86    AST (SGOT) 24    ALT (SGPT) 18    WBC 5.36    Hemoglobin 13.5    Hematocrit 40.1    Platelets 285    Total Cholesterol 228    Triglycerides 85    HDL Cholesterol 70    LDL Cholesterol  143    Hemoglobin A1C 4.90      Tobacco Use: Medium Risk (7/11/2025)    Patient History     Smoking Tobacco Use: Former     Smokeless Tobacco Use: Never     Passive Exposure: Not on file     Social History     Substance and Sexual Activity   Alcohol Use Yes    Alcohol/week: 10.0 standard drinks of alcohol    Types: 7 Glasses of wine, 3 Standard drinks or equivalent per week     Family History   Problem Relation Age of Onset    Cancer Mother         breast cancer    Hyperlipidemia Mother     Stroke Father     Cancer Father         melanoma    Hypertension Father     Hyperlipidemia Father     Cancer Maternal Aunt     Cancer Maternal Uncle     Stroke Paternal Grandmother                Assessment and Plan   Diagnoses and all orders for this visit:    1. Annual physical exam (Primary)  Assessment & Plan:  Current recommendations according to the current Physical Activity Guidelines for Americans: adults need 150-300 minutes of physical exercise weekly. It is also recommended to perform two sessions of full body strength training exercise weekly which includes all major muscle groups including legs, hips, back, abdomen, chest, shoulders, and arms.   Current CDC recommendations for diet include following a diet that emphasizes fruits, vegetables, whole grains that is low in saturated fats and low in sugar intake.   Adults should consume at least 3 cup equivalents of fruit and vegetables daily. It is also beneficial to get 25 grams of fiber daily unless told otherwise by your healthcare provider.   Labs reviewed during visit  Mammogram ordered  Cologuard ordered  Dexa deferred to gynecology  Pap deferred to gynecology  Anticipatory guidance given regarding health  prevention/wellness, diet/exercise, tobacco/alcohol/drug education, exercise and wellbeing, vaccination recommendations, and sexual health/STD education.   Recommended bi-yearly dental exams and regular vision examinations.         2. History of non anemic vitamin B12 deficiency  Assessment & Plan:  Lab Results   Component Value Date    [id number] 07/07/2025     Continues on b12 supplementation      3. Mixed hyperlipidemia  Assessment & Plan:  Lab Results   Component Value Date    CHOL 228 (H) 07/07/2025    CHLPL 215 (H) 10/11/2022    TRIG 85 07/07/2025    HDL 70 (H) 07/07/2025     (H) 07/07/2025     Starting atorvastatin 10mg QHS  We discussed possible s/e of medication  Will plan for recheck in lab in 3 months  Recommend following a low saturated fat, low sugar diet and getting 150 minutes of weekly exercise.       Orders:  -     atorvastatin (LIPITOR) 10 MG tablet; Take 1 tablet by mouth Daily.  Dispense: 90 tablet; Refill: 1  -     Lipid panel; Future    4. Psoriasis  Assessment & Plan:  Clobetasol prescribed to use on patches on forearm and shoulder region  Recommended to use for 14 days at a time, then off 7  Recommended luke warm showers; using some form of moisturizer as well to help with skin hydration  We discussed options for scalp including OTC coal tar or nizoral   If symptoms persist or worsen, may benefit from consultation with dermatology    Orders:  -     clobetasol propionate (TEMOVATE) 0.05 % cream; Apply topically to the appropriate area as directed 2 (Two) Times a Day.  Dispense: 60 g; Refill: 2    5. Left hip pain  Assessment & Plan:  XR today      Orders:  -     XR Hip With or Without Pelvis 2 - 3 View Left; Future    6. Vitamin D deficiency  Assessment & Plan:  Plan for recheck in 3 months in lab  She is going to be increasing her yogurt intake, getting a little bit more sun exposure, and taking some calcium/vitamin D OTC    Orders:  -     Cancel: Vitamin D 25 hydroxy; Future  -      Cancel: Vitamin D 25 hydroxy; Future  -     Vitamin D 25 hydroxy; Future    7. Screening mammogram for breast cancer  -     Mammo Screening Digital Tomosynthesis Bilateral With CAD; Future    8. Colon cancer screening  -     Cologuard - Stool, Per Rectum; Future             Follow Up   Return in about 1 year (around 7/11/2026) for Annual physical.  Patient was given instructions and counseling regarding her condition or for health maintenance advice. Please see specific information pulled into the AVS if appropriate.

## 2025-07-11 NOTE — ASSESSMENT & PLAN NOTE
Clobetasol prescribed to use on patches on forearm and shoulder region  Recommended to use for 14 days at a time, then off 7  Recommended luke warm showers; using some form of moisturizer as well to help with skin hydration  We discussed options for scalp including OTC coal tar or nizoral   If symptoms persist or worsen, may benefit from consultation with dermatology

## 2025-07-11 NOTE — ASSESSMENT & PLAN NOTE
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  The nuclei are homogenous.  There is no pronounced variation in size or intensity of staining and no significant anaplastic appearance.  The cells at the outer aspect of the tumor masses show palisading of nuclei.  Tumor masses are surrounded by a connective tissue stroma and in some areas there is retraction artifact of the tumor nodule away from the surrounding stroma.  A mixed nodular and micronodular pattern is noted.

## 2025-07-11 NOTE — ASSESSMENT & PLAN NOTE
Lab Results   Component Value Date    CHOL 228 (H) 07/07/2025    CHLPL 215 (H) 10/11/2022    TRIG 85 07/07/2025    HDL 70 (H) 07/07/2025     (H) 07/07/2025     Starting atorvastatin 10mg QHS  We discussed possible s/e of medication  Will plan for recheck in lab in 3 months  Recommend following a low saturated fat, low sugar diet and getting 150 minutes of weekly exercise.

## 2025-07-11 NOTE — ASSESSMENT & PLAN NOTE
Current recommendations according to the current Physical Activity Guidelines for Americans: adults need 150-300 minutes of physical exercise weekly. It is also recommended to perform two sessions of full body strength training exercise weekly which includes all major muscle groups including legs, hips, back, abdomen, chest, shoulders, and arms.   Current CDC recommendations for diet include following a diet that emphasizes fruits, vegetables, whole grains that is low in saturated fats and low in sugar intake.   Adults should consume at least 3 cup equivalents of fruit and vegetables daily. It is also beneficial to get 25 grams of fiber daily unless told otherwise by your healthcare provider.   Labs reviewed during visit  Mammogram ordered  Cologuard ordered  Dexa deferred to gynecology  Pap deferred to gynecology  Anticipatory guidance given regarding health prevention/wellness, diet/exercise, tobacco/alcohol/drug education, exercise and wellbeing, vaccination recommendations, and sexual health/STD education.   Recommended bi-yearly dental exams and regular vision examinations.

## 2025-07-11 NOTE — ASSESSMENT & PLAN NOTE
Lab Results   Component Value Date    [id number] 07/07/2025     Continues on b12 supplementation